# Patient Record
Sex: FEMALE | Race: WHITE | NOT HISPANIC OR LATINO
[De-identification: names, ages, dates, MRNs, and addresses within clinical notes are randomized per-mention and may not be internally consistent; named-entity substitution may affect disease eponyms.]

---

## 2018-11-09 ENCOUNTER — RESULT REVIEW (OUTPATIENT)
Age: 58
End: 2018-11-09

## 2019-11-21 ENCOUNTER — RESULT REVIEW (OUTPATIENT)
Age: 59
End: 2019-11-21

## 2020-12-10 ENCOUNTER — RESULT REVIEW (OUTPATIENT)
Age: 60
End: 2020-12-10

## 2021-03-11 ENCOUNTER — RESULT REVIEW (OUTPATIENT)
Age: 61
End: 2021-03-11

## 2021-10-26 ENCOUNTER — APPOINTMENT (OUTPATIENT)
Dept: HEART AND VASCULAR | Facility: CLINIC | Age: 61
End: 2021-10-26
Payer: COMMERCIAL

## 2021-10-26 ENCOUNTER — NON-APPOINTMENT (OUTPATIENT)
Age: 61
End: 2021-10-26

## 2021-10-26 VITALS
SYSTOLIC BLOOD PRESSURE: 122 MMHG | TEMPERATURE: 97.1 F | RESPIRATION RATE: 16 BRPM | HEART RATE: 72 BPM | HEIGHT: 66.5 IN | BODY MASS INDEX: 36.53 KG/M2 | WEIGHT: 230 LBS | DIASTOLIC BLOOD PRESSURE: 88 MMHG | OXYGEN SATURATION: 93 %

## 2021-10-26 DIAGNOSIS — Z82.49 FAMILY HISTORY OF ISCHEMIC HEART DISEASE AND OTHER DISEASES OF THE CIRCULATORY SYSTEM: ICD-10-CM

## 2021-10-26 DIAGNOSIS — Z78.9 OTHER SPECIFIED HEALTH STATUS: ICD-10-CM

## 2021-10-26 DIAGNOSIS — R01.1 CARDIAC MURMUR, UNSPECIFIED: ICD-10-CM

## 2021-10-26 DIAGNOSIS — E73.9 LACTOSE INTOLERANCE, UNSPECIFIED: ICD-10-CM

## 2021-10-26 DIAGNOSIS — K90.41 NON-CELIAC GLUTEN SENSITIVITY: ICD-10-CM

## 2021-10-26 DIAGNOSIS — Z86.39 PERSONAL HISTORY OF OTHER ENDOCRINE, NUTRITIONAL AND METABOLIC DISEASE: ICD-10-CM

## 2021-10-26 DIAGNOSIS — Z00.00 ENCOUNTER FOR GENERAL ADULT MEDICAL EXAMINATION W/OUT ABNORMAL FINDINGS: ICD-10-CM

## 2021-10-26 DIAGNOSIS — E03.2 HYPOTHYROIDISM DUE TO MEDICAMENTS AND OTHER EXOGENOUS SUBSTANCES: ICD-10-CM

## 2021-10-26 DIAGNOSIS — Z87.19 PERSONAL HISTORY OF OTHER DISEASES OF THE DIGESTIVE SYSTEM: ICD-10-CM

## 2021-10-26 DIAGNOSIS — Z87.898 PERSONAL HISTORY OF OTHER SPECIFIED CONDITIONS: ICD-10-CM

## 2021-10-26 DIAGNOSIS — Z87.09 PERSONAL HISTORY OF OTHER DISEASES OF THE RESPIRATORY SYSTEM: ICD-10-CM

## 2021-10-26 PROCEDURE — 99205 OFFICE O/P NEW HI 60 MIN: CPT

## 2021-10-26 PROCEDURE — 93000 ELECTROCARDIOGRAM COMPLETE: CPT

## 2021-10-26 RX ORDER — SIMVASTATIN 20 MG/1
20 TABLET, FILM COATED ORAL AT BEDTIME
Refills: 0 | Status: ACTIVE | COMMUNITY

## 2021-10-26 RX ORDER — CARVEDILOL 12.5 MG/1
12.5 TABLET, FILM COATED ORAL AT BEDTIME
Refills: 0 | Status: ACTIVE | COMMUNITY

## 2021-10-26 RX ORDER — LEVOTHYROXINE SODIUM 175 UG/1
175 TABLET ORAL EVERY MORNING
Refills: 0 | Status: ACTIVE | COMMUNITY

## 2021-10-26 RX ORDER — LOSARTAN POTASSIUM 50 MG/1
50 TABLET, FILM COATED ORAL EVERY MORNING
Refills: 0 | Status: ACTIVE | COMMUNITY

## 2021-10-26 RX ORDER — HYDROCHLOROTHIAZIDE 12.5 MG/1
12.5 TABLET ORAL EVERY MORNING
Refills: 0 | Status: ACTIVE | COMMUNITY

## 2021-10-26 NOTE — PHYSICAL EXAM

## 2021-10-26 NOTE — HISTORY OF PRESENT ILLNESS
[FreeTextEntry1] : Diana Ling is a 60 yo female who presents for CV evaluation.\par \par She denies cp, sob, pnd, orthopnea, edema, palp, or loc.\par \par She is active.  She is compliant with meds.\par \par ECG today reveals NSR, low voltage\par \par Clinical hx reviewed in detail.\par \par Recommendations:\par 1. collect records\par 2. blood work\par 3. EXSE\par 4. CPET\par 5. carotid duplex\par 6. exercise\par 7. dietary counseling

## 2021-10-26 NOTE — REVIEW OF SYSTEMS
[Earache] : no earache [Discharge From Ears] : no discharge from the ears [Hearing Loss] : no hearing loss [Mouth Sores] : no mouth sores [Sore Throat] : no sore throat [Sinus Pressure] : sinus pressure [Tinnitus] : no tinnitus [Vertigo] : no vertigo [Negative] : Heme/Lymph

## 2021-10-26 NOTE — REASON FOR VISIT
[Structural Heart and Valve Disease] : structural heart and valve disease [Hyperlipidemia] : hyperlipidemia [Hypertension] : hypertension [FreeTextEntry3] : Noe Clark

## 2021-10-26 NOTE — DISCUSSION/SUMMARY
[Hyperlipidemia] : hyperlipidemia [Diet Modification] : diet modification [Exercise] : exercise [Hypertension] : hypertension [Stable] : stable [None] : There are no changes in medication management [Exercise Regimen] : an exercise regimen [Dietary Modification] : dietary modification [Weight Loss] : weight loss [Low Sodium Diet] : low sodium diet [Patient] : the patient [FreeTextEntry1] : murmur

## 2021-10-27 LAB
ALBUMIN SERPL ELPH-MCNC: 4.9 G/DL
ALP BLD-CCNC: 76 U/L
ALT SERPL-CCNC: 32 U/L
ANION GAP SERPL CALC-SCNC: 14 MMOL/L
AST SERPL-CCNC: 26 U/L
BASOPHILS # BLD AUTO: 0.06 K/UL
BASOPHILS NFR BLD AUTO: 0.8 %
BILIRUB SERPL-MCNC: 0.5 MG/DL
BUN SERPL-MCNC: 13 MG/DL
CALCIUM SERPL-MCNC: 10.3 MG/DL
CHLORIDE SERPL-SCNC: 101 MMOL/L
CHOLEST SERPL-MCNC: 173 MG/DL
CO2 SERPL-SCNC: 26 MMOL/L
CREAT SERPL-MCNC: 0.76 MG/DL
EOSINOPHIL # BLD AUTO: 0.26 K/UL
EOSINOPHIL NFR BLD AUTO: 3.6 %
GLUCOSE SERPL-MCNC: 103 MG/DL
HCT VFR BLD CALC: 41.2 %
HDLC SERPL-MCNC: 52 MG/DL
HGB BLD-MCNC: 13.5 G/DL
IMM GRANULOCYTES NFR BLD AUTO: 0.3 %
LDLC SERPL CALC-MCNC: 90 MG/DL
LYMPHOCYTES # BLD AUTO: 2.26 K/UL
LYMPHOCYTES NFR BLD AUTO: 31.7 %
MAN DIFF?: NORMAL
MCHC RBC-ENTMCNC: 31.8 PG
MCHC RBC-ENTMCNC: 32.8 GM/DL
MCV RBC AUTO: 96.9 FL
MONOCYTES # BLD AUTO: 0.66 K/UL
MONOCYTES NFR BLD AUTO: 9.3 %
NEUTROPHILS # BLD AUTO: 3.87 K/UL
NEUTROPHILS NFR BLD AUTO: 54.3 %
NONHDLC SERPL-MCNC: 121 MG/DL
PLATELET # BLD AUTO: 350 K/UL
POTASSIUM SERPL-SCNC: 4 MMOL/L
PROT SERPL-MCNC: 7.4 G/DL
RBC # BLD: 4.25 M/UL
RBC # FLD: 13 %
SODIUM SERPL-SCNC: 141 MMOL/L
TRIGL SERPL-MCNC: 152 MG/DL
WBC # FLD AUTO: 7.13 K/UL

## 2021-12-17 ENCOUNTER — APPOINTMENT (OUTPATIENT)
Dept: HEART AND VASCULAR | Facility: CLINIC | Age: 61
End: 2021-12-17
Payer: COMMERCIAL

## 2021-12-17 PROCEDURE — 93880 EXTRACRANIAL BILAT STUDY: CPT

## 2021-12-23 ENCOUNTER — NON-APPOINTMENT (OUTPATIENT)
Age: 61
End: 2021-12-23

## 2022-01-13 ENCOUNTER — TRANSCRIPTION ENCOUNTER (OUTPATIENT)
Age: 62
End: 2022-01-13

## 2022-02-01 ENCOUNTER — APPOINTMENT (OUTPATIENT)
Dept: HEART AND VASCULAR | Facility: CLINIC | Age: 62
End: 2022-02-01
Payer: COMMERCIAL

## 2022-02-01 VITALS
SYSTOLIC BLOOD PRESSURE: 122 MMHG | BODY MASS INDEX: 39.99 KG/M2 | HEIGHT: 65 IN | OXYGEN SATURATION: 98 % | TEMPERATURE: 97.9 F | DIASTOLIC BLOOD PRESSURE: 88 MMHG | WEIGHT: 240 LBS

## 2022-02-01 PROCEDURE — 94010 BREATHING CAPACITY TEST: CPT | Mod: 59

## 2022-02-01 PROCEDURE — 94621 CARDIOPULM EXERCISE TESTING: CPT

## 2022-03-17 ENCOUNTER — RESULT REVIEW (OUTPATIENT)
Age: 62
End: 2022-03-17

## 2022-03-29 ENCOUNTER — APPOINTMENT (OUTPATIENT)
Dept: HEART AND VASCULAR | Facility: CLINIC | Age: 62
End: 2022-03-29
Payer: COMMERCIAL

## 2022-03-29 VITALS
HEART RATE: 71 BPM | HEIGHT: 65 IN | WEIGHT: 242 LBS | BODY MASS INDEX: 40.32 KG/M2 | OXYGEN SATURATION: 97 % | SYSTOLIC BLOOD PRESSURE: 110 MMHG | DIASTOLIC BLOOD PRESSURE: 76 MMHG

## 2022-03-29 PROCEDURE — 93320 DOPPLER ECHO COMPLETE: CPT

## 2022-03-29 PROCEDURE — 93351 STRESS TTE COMPLETE: CPT

## 2022-03-29 PROCEDURE — 93325 DOPPLER ECHO COLOR FLOW MAPG: CPT

## 2022-03-31 ENCOUNTER — NON-APPOINTMENT (OUTPATIENT)
Age: 62
End: 2022-03-31

## 2022-04-15 ENCOUNTER — APPOINTMENT (OUTPATIENT)
Dept: HEART AND VASCULAR | Facility: CLINIC | Age: 62
End: 2022-04-15
Payer: COMMERCIAL

## 2022-04-15 PROCEDURE — 99443: CPT

## 2022-04-15 NOTE — REVIEW OF SYSTEMS
[Earache] : no earache [Discharge From Ears] : no discharge from the ears [Hearing Loss] : no hearing loss [Mouth Sores] : no mouth sores [Sore Throat] : no sore throat [Sinus Pressure] : no sinus pressure [Tinnitus] : no tinnitus [Vertigo] : no vertigo [Negative] : Heme/Lymph

## 2022-04-15 NOTE — HISTORY OF PRESENT ILLNESS
[FreeTextEntry1] : Diana Ling requests telephone follow up.  Consent obtained and recorded.  She is at her home and doctor is in [place], [place].  \par \par She denies cp, sob, pnd, orthopnea, edema, palp, or loc.\par \par She is active.  She is compliant with meds.\par \par Carotid Duplex 12/2021: nl\par CPET 2/2022: dec RER; 32% predicted peak VO2\par EXSE 3/2022: nl lv sys fxn; indeterminant reece fxn; LVH; aortic root 3.9 cm; mild AI; 6:15 min Boris; pos ECG; no ischemia by WM ( dec sens dec hr)\par \par Clinical hx and results reviewed in detail.\par \par PE from 10/2021 eval.\par \par Recommendations:\par 1. collect records\par 2. continue CV meds\par 3. exercise\par 4. dietary counseling\par 5. get CTA

## 2022-04-15 NOTE — REASON FOR VISIT
[Structural Heart and Valve Disease] : structural heart and valve disease [Hyperlipidemia] : hyperlipidemia [Hypertension] : hypertension [Coronary Artery Disease] : coronary artery disease [Carotid, Aortic and Peripheral Vascular Disease] : carotid, aortic and peripheral vascular disease [FreeTextEntry3] : Noe Clark

## 2022-04-15 NOTE — PHYSICAL EXAM

## 2022-04-15 NOTE — DISCUSSION/SUMMARY
[Sodium Restriction] : sodium restriction [Possible Cardiac Ischemia (Intermd Prob)] : possible cardiac ischemia (intermediate probability) [Deteriorating] : deteriorating [Weight Reduction] : weight reduction [Hyperlipidemia] : hyperlipidemia [Diet Modification] : diet modification [Exercise] : exercise [Hypertension] : hypertension [Exercise Regimen] : an exercise regimen [Dietary Modification] : dietary modification [Weight Loss] : weight loss [Low Sodium Diet] : low sodium diet [Stable] : stable [None] : There are no changes in medication management [Exercise Rehab] : exercise rehabilitation [Patient] : the patient [de-identified] : abnl stress ECG [de-identified] : dilated aortic root 3.9 cm [FreeTextEntry1] : AI - mild

## 2022-05-10 ENCOUNTER — RESULT REVIEW (OUTPATIENT)
Age: 62
End: 2022-05-10

## 2022-05-17 ENCOUNTER — APPOINTMENT (OUTPATIENT)
Dept: BREAST CENTER | Facility: CLINIC | Age: 62
End: 2022-05-17
Payer: COMMERCIAL

## 2022-05-17 ENCOUNTER — NON-APPOINTMENT (OUTPATIENT)
Age: 62
End: 2022-05-17

## 2022-05-17 DIAGNOSIS — Z86.79 PERSONAL HISTORY OF OTHER DISEASES OF THE CIRCULATORY SYSTEM: ICD-10-CM

## 2022-05-17 DIAGNOSIS — R92.8 OTHER ABNORMAL AND INCONCLUSIVE FINDINGS ON DIAGNOSTIC IMAGING OF BREAST: ICD-10-CM

## 2022-05-17 DIAGNOSIS — D24.1 BENIGN NEOPLASM OF RIGHT BREAST: ICD-10-CM

## 2022-05-17 PROCEDURE — 99203 OFFICE O/P NEW LOW 30 MIN: CPT

## 2022-05-17 NOTE — HISTORY OF PRESENT ILLNESS
[FreeTextEntry1] : The patient is a 62-year-old nulliparous postmenopausal white female with [place]er, Mexican, English, and Canadian descent.  She underwent menarche at age 14.  She underwent menopause at age 49 and never took any hormone replacement therapy.  She has no family history of breast or ovarian cancer.  She has a history of undergoing a bilateral reduction mastopexy in 2021.  She underwent a screening mammography and ultrasound performed at [facility] on March 7, 2022 showing a density in the right breast lower inner quadrant measuring about 5 to 6 mm which was later seen on directed ultrasound.  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  This was very close to a previous biopsy which had been performed in the past ("buckle" clip).  She comes in now for a surgical evaluation.

## 2022-05-17 NOTE — PHYSICAL EXAM
[Normocephalic] : normocephalic [Atraumatic] : atraumatic [EOMI] : extra ocular movement intact [Supple] : supple [No Supraclavicular Adenopathy] : no supraclavicular adenopathy [No Cervical Adenopathy] : no cervical adenopathy [Examined in the supine and seated position] : examined in the supine and seated position [No dominant masses] : no dominant masses in right breast  [No dominant masses] : no dominant masses left breast [No Nipple Retraction] : no left nipple retraction [No Nipple Discharge] : no left nipple discharge [Breast Mass Right Breast ___cm] : no masses [Breast Mass Left Breast ___cm] : no masses [Breast Nipple Inversion] : nipples not inverted [Breast Nipple Retraction] : nipples not retracted [Breast Nipple Flattening] : nipples not flattened [Breast Nipple Fissures] : nipples not fissured [Breast Abnormal Lactation (Galactorrhea)] : no galactorrhea [Breast Abnormal Secretion Bloody Fluid] : no bloody discharge [Breast Abnormal Secretion Serous Fluid] : no serous discharge [Breast Abnormal Secretion Opalescent Fluid] : no milky discharge [No Axillary Lymphadenopathy] : no left axillary lymphadenopathy [No Edema] : no edema [No Rashes] : no rashes [No Ulceration] : no ulceration [de-identified] : On exam, she has ptotic C-cup breasts with obvious bilateral reduction mastopexy incisions.  On palpation, I cannot feel any suspicious densities in either breast even with close attention to the lower aspect of the right breast.  She has no axillary, supraclavicular, or cervical adenopathy.

## 2022-05-17 NOTE — ADDENDUM
[FreeTextEntry1] : Greater than 75% of the consultation was performed face-to-face involving coordination of care and counseling.

## 2022-05-17 NOTE — REASON FOR VISIT
[Consultation] : a consultation visit [FreeTextEntry1] : The patient comes in and is of Maldivian English Indonesian Slovak descent and she underwent an ultrasound-guided core biopsy of the right breast 6:00 density seen on mammography and ultrasound and pathology showed a transected intraductal papilloma.  She comes in now for a surgical evaluation.

## 2022-05-20 ENCOUNTER — RESULT REVIEW (OUTPATIENT)
Age: 62
End: 2022-05-20

## 2022-05-26 ENCOUNTER — NON-APPOINTMENT (OUTPATIENT)
Age: 62
End: 2022-05-26

## 2022-05-27 ENCOUNTER — APPOINTMENT (OUTPATIENT)
Dept: HEART AND VASCULAR | Facility: CLINIC | Age: 62
End: 2022-05-27
Payer: COMMERCIAL

## 2022-05-27 PROCEDURE — 99443: CPT

## 2022-05-27 NOTE — HISTORY OF PRESENT ILLNESS
[FreeTextEntry1] : Diana Ling requests telephone follow up.  Consent obtained and recorded.  She is at her home and doctor is in [place], [place].    \par \par She denies cp, sob, pnd, orthopnea, edema, palp, or loc.\par \par She is active.  She is compliant with meds.\par \par Carotid Duplex 12/2021: nl\par CPET 2/2022: dec RER; 32% predicted peak VO2\par EXSE 3/2022: nl lv sys fxn; indeterminant reece fxn; LVH; aortic root 3.9 cm; mild AI; 6:15 min Boris; pos ECG; no ischemia by WM ( dec sens dec hr)\par CTA 5/2022: min LAD plaque; LAD bridge (shallow)\par \par Clinical hx and results reviewed in detail.\par \par PE from 10/2021 eval.\par \par Recommendations:\par 1. collect records\par 2. continue CV meds\par 3. exercise\par 4. dietary counseling - Mediterranean diet\par 5. f/u in 4 months

## 2022-05-27 NOTE — DISCUSSION/SUMMARY
[Sodium Restriction] : sodium restriction [Coronary Artery Disease] : coronary artery disease [Possible Cardiac Ischemia (Intermd Prob)] : possible cardiac ischemia (intermediate probability) [Weight Reduction] : weight reduction [Hyperlipidemia] : hyperlipidemia [Diet Modification] : diet modification [Exercise] : exercise [Hypertension] : hypertension [Exercise Regimen] : an exercise regimen [Dietary Modification] : dietary modification [Weight Loss] : weight loss [Low Sodium Diet] : low sodium diet [Stable] : stable [None] : There are no changes in medication management [Exercise Rehab] : exercise rehabilitation [Patient] : the patient [de-identified] : LAD min plaque and bridge by CTA 5/2022; abnl stress ECG [de-identified] : dilated aortic root 3.9 cm [FreeTextEntry1] : AI - mild

## 2022-05-27 NOTE — PHYSICAL EXAM

## 2022-09-27 ENCOUNTER — APPOINTMENT (OUTPATIENT)
Dept: HEART AND VASCULAR | Facility: CLINIC | Age: 62
End: 2022-09-27

## 2022-09-27 VITALS
RESPIRATION RATE: 16 BRPM | BODY MASS INDEX: 39.99 KG/M2 | HEIGHT: 65 IN | TEMPERATURE: 97.8 F | OXYGEN SATURATION: 97 % | SYSTOLIC BLOOD PRESSURE: 110 MMHG | DIASTOLIC BLOOD PRESSURE: 80 MMHG | WEIGHT: 240 LBS | HEART RATE: 74 BPM

## 2022-09-27 DIAGNOSIS — R94.39 ABNORMAL RESULT OF OTHER CARDIOVASCULAR FUNCTION STUDY: ICD-10-CM

## 2022-09-27 PROCEDURE — 99215 OFFICE O/P EST HI 40 MIN: CPT

## 2022-09-27 RX ORDER — DIAZEPAM 5 MG/1
5 TABLET ORAL
Qty: 2 | Refills: 0 | Status: DISCONTINUED | COMMUNITY
Start: 2022-05-11 | End: 2022-09-27

## 2022-09-27 NOTE — DISCUSSION/SUMMARY
[Sodium Restriction] : sodium restriction [Coronary Artery Disease] : coronary artery disease [Possible Cardiac Ischemia (Intermd Prob)] : possible cardiac ischemia (intermediate probability) [Weight Reduction] : weight reduction [Hyperlipidemia] : hyperlipidemia [Diet Modification] : diet modification [Exercise] : exercise [Hypertension] : hypertension [Exercise Regimen] : an exercise regimen [Dietary Modification] : dietary modification [Weight Loss] : weight loss [Low Sodium Diet] : low sodium diet [Stable] : stable [None] : There are no changes in medication management [Exercise Rehab] : exercise rehabilitation [Patient] : the patient [de-identified] : LAD min plaque and bridge by CTA 5/2022; abnl stress ECG [de-identified] : dilated aortic root 3.9 cm [FreeTextEntry1] : AI - mild

## 2022-09-27 NOTE — PHYSICAL EXAM

## 2022-09-27 NOTE — HISTORY OF PRESENT ILLNESS
[FreeTextEntry1] : Diana Ling returns for follow up.      \par \par She denies cp, sob, pnd, orthopnea, edema, palp, or loc.\par \par She is active.  She is compliant with meds.\par \par Carotid Duplex 12/2021: nl\par CPET 2/2022: dec RER; 32% predicted peak VO2\par EXSE 3/2022: nl lv sys fxn; indeterminant reece fxn; LVH; aortic root 3.9 cm; mild AI; 6:15 min Boris; pos ECG; no ischemia by WM ( dec sens dec hr)\par CTA 5/2022: min LAD plaque; LAD bridge (shallow)\par \par Clinical hx reviewed in detail.\par \par Recommendations:\par 1. collect records\par 2. continue CV meds\par 3. exercise\par 4. dietary counseling - Mediterranean diet\par 5. f/u in 6 months

## 2022-11-08 NOTE — HISTORY OF PRESENT ILLNESS
[FreeTextEntry1] : The patient is a 62-year-old nulliparous postmenopausal white female with Kuwaiti, Algerian, English, and Canadian descent.  She underwent menarche at age 14.  She underwent menopause at age 49 and never took any hormone replacement therapy.  She has no family history of breast or ovarian cancer.  She has a history of undergoing a bilateral reduction mastopexy in 2021.  She underwent a screening mammography and ultrasound performed at [facility] on March 7, 2022 showing a density in the right breast lower inner quadrant measuring about 5 to 6 mm which was later seen on directed ultrasound.  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  This was very close to a previous biopsy which had been performed in the past ("buckle" clip).  After consultation back in May 2022 it was felt that this area could be followed and she comes in now for follow-up evaluation.

## 2022-11-08 NOTE — REASON FOR VISIT
[Follow-Up: _____] : a [unfilled] follow-up visit [FreeTextEntry1] : The patient comes in and is of Kosovan English Sami Nepali descent and she underwent an ultrasound-guided core biopsy of the right breast 6:00 density seen on mammography and ultrasound from March 2022 and pathology showed a transected intraductal papilloma.  It was decided to follow this area clinically and she comes in now for follow-up.

## 2022-11-08 NOTE — ASSESSMENT
[FreeTextEntry1] : The patient is a 62-year-old nulliparous postmenopausal white female with Chinese, Andorran, English, and Algerian descent.  She underwent menarche at age 14.  She underwent menopause at age 49 and never took any hormone replacement therapy.  She has no family history of breast or ovarian cancer.  She has a history of undergoing a bilateral reduction mastopexy in 2021.  She underwent a screening mammography and ultrasound performed at [facility] on March 7, 2022 showing a density in the right breast lower inner quadrant measuring about 5 to 6 mm which was later seen on directed ultrasound.  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  This was very close to a previous biopsy which had been performed in the past ("buckle" clip).  I reviewed her imaging and indeed she had this density seen in the right breast lower inner quadrant on mammography from May 7, 2022 and ultrasound did show a corresponding mass which was biopsied showing this intraductal papilloma.  It was felt to have a fairly benign appearance on imaging and close follow-up was recommended.  She underwent a follow-up diagnostic right breast mammography and ultrasound which was reviewed from ??????????. The patient was reassured and should just go back to her routine bilateral mammography and ultrasound again in March 2023.  I would like to see her again at that time and if everything is unchanged she can then go back to yearly follow-up with yearly mammography and ultrasound.

## 2022-11-08 NOTE — PHYSICAL EXAM
[Normocephalic] : normocephalic [Atraumatic] : atraumatic [EOMI] : extra ocular movement intact [Supple] : supple [No Supraclavicular Adenopathy] : no supraclavicular adenopathy [No Cervical Adenopathy] : no cervical adenopathy [Examined in the supine and seated position] : examined in the supine and seated position [No dominant masses] : no dominant masses in right breast  [No dominant masses] : no dominant masses left breast [No Nipple Retraction] : no left nipple retraction [No Nipple Discharge] : no left nipple discharge [Breast Mass Right Breast ___cm] : no masses [Breast Mass Left Breast ___cm] : no masses [No Axillary Lymphadenopathy] : no left axillary lymphadenopathy [No Edema] : no edema [No Rashes] : no rashes [No Ulceration] : no ulceration [Breast Nipple Inversion] : nipples not inverted [Breast Nipple Retraction] : nipples not retracted [Breast Nipple Flattening] : nipples not flattened [Breast Nipple Fissures] : nipples not fissured [Breast Abnormal Lactation (Galactorrhea)] : no galactorrhea [Breast Abnormal Secretion Bloody Fluid] : no bloody discharge [Breast Abnormal Secretion Serous Fluid] : no serous discharge [Breast Abnormal Secretion Opalescent Fluid] : no milky discharge [de-identified] : On exam, she has ptotic C-cup breasts with obvious bilateral reduction mastopexy incisions.  On palpation, I cannot feel any suspicious densities in either breast even with close attention to the lower aspect of the right breast.  She has no axillary, supraclavicular, or cervical adenopathy.

## 2022-11-14 ENCOUNTER — APPOINTMENT (OUTPATIENT)
Dept: BREAST CENTER | Facility: CLINIC | Age: 62
End: 2022-11-14

## 2022-12-15 ENCOUNTER — APPOINTMENT (OUTPATIENT)
Dept: BREAST CENTER | Facility: CLINIC | Age: 62
End: 2022-12-15

## 2022-12-15 VITALS
BODY MASS INDEX: 39.94 KG/M2 | DIASTOLIC BLOOD PRESSURE: 85 MMHG | WEIGHT: 240 LBS | OXYGEN SATURATION: 96 % | SYSTOLIC BLOOD PRESSURE: 134 MMHG | HEART RATE: 75 BPM

## 2022-12-15 DIAGNOSIS — N64.4 MASTODYNIA: ICD-10-CM

## 2022-12-15 DIAGNOSIS — D24.1 BENIGN NEOPLASM OF RIGHT BREAST: ICD-10-CM

## 2022-12-15 DIAGNOSIS — N60.11 DIFFUSE CYSTIC MASTOPATHY OF LEFT BREAST: ICD-10-CM

## 2022-12-15 DIAGNOSIS — N60.12 DIFFUSE CYSTIC MASTOPATHY OF LEFT BREAST: ICD-10-CM

## 2022-12-15 PROCEDURE — 99213 OFFICE O/P EST LOW 20 MIN: CPT

## 2022-12-15 NOTE — ASSESSMENT
[FreeTextEntry1] : The patient is a 62-year-old nulliparous postmenopausal white female with Macedonian, Iranian, English, and Togolese descent.  She underwent menarche at age 14.  She underwent menopause at age 49 and never took any hormone replacement therapy.  She has no family history of breast or ovarian cancer.  She has a history of undergoing a bilateral reduction mastopexy in 2021.  She underwent a screening mammography and ultrasound performed at [facility] on March 7, 2022 showing a density in the right breast lower inner quadrant measuring about 5 to 6 mm which was later seen on directed ultrasound.  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  This was very close to a previous biopsy which had been performed in the past ("buckle" clip).  I reviewed her imaging and indeed she had this density seen in the right breast lower inner quadrant on mammography from March 7, 2022 and ultrasound did show a corresponding mass which was biopsied showing this intraductal papilloma.  It was felt to have a fairly benign appearance on imaging and close follow-up was recommended.  She is due for a follow-up diagnostic right breast mammography and ultrasound now.  On exam today I cannot feel any suspicious densities in either breast and she does have the typical bilateral Wise reduction pattern incisions.  She is complaining of some tenderness on the inframammary aspect of the right breast which feels like its in the scarring on the inframammary incision.  The patient was reassured it is due for diagnostic right breast mammography and ultrasound now and was given prescriptions.  If that is unchanged and negative she should just go back to her routine yearly bilateral mammography and ultrasound again in March 2023.  I would like to see her again at that time and if everything is unchanged she can then go back to yearly follow-up with yearly mammography and ultrasound.

## 2022-12-15 NOTE — REASON FOR VISIT
[Follow-Up: _____] : a [unfilled] follow-up visit [FreeTextEntry1] : The patient comes in and is of Bol[place]ian English Khmer Frisian descent and she underwent an ultrasound-guided core biopsy of the right breast 6:00 density seen on mammography and ultrasound from March 2022 and pathology showed a transected intraductal papilloma.  It was decided to follow this area clinically and she comes in now for follow-up.

## 2022-12-15 NOTE — PHYSICAL EXAM
[Normocephalic] : normocephalic [Atraumatic] : atraumatic [EOMI] : extra ocular movement intact [Supple] : supple [No Supraclavicular Adenopathy] : no supraclavicular adenopathy [No Cervical Adenopathy] : no cervical adenopathy [Examined in the supine and seated position] : examined in the supine and seated position [No dominant masses] : no dominant masses in right breast  [No dominant masses] : no dominant masses left breast [No Nipple Retraction] : no left nipple retraction [No Nipple Discharge] : no left nipple discharge [Breast Mass Right Breast ___cm] : no masses [Breast Mass Left Breast ___cm] : no masses [No Axillary Lymphadenopathy] : no left axillary lymphadenopathy [No Edema] : no edema [No Rashes] : no rashes [No Ulceration] : no ulceration [Breast Nipple Inversion] : nipples not inverted [Breast Nipple Retraction] : nipples not retracted [Breast Nipple Flattening] : nipples not flattened [Breast Nipple Fissures] : nipples not fissured [Breast Abnormal Lactation (Galactorrhea)] : no galactorrhea [Breast Abnormal Secretion Bloody Fluid] : no bloody discharge [Breast Abnormal Secretion Serous Fluid] : no serous discharge [Breast Abnormal Secretion Opalescent Fluid] : no milky discharge [de-identified] : On exam, she has ptotic C-cup breasts with obvious bilateral reduction mastopexy incisions.  On palpation, I cannot feel any suspicious densities in either breast even with close attention to the lower aspect of the right breast.  She is complaining of some tenderness on the inframammary right breast incision but I cannot feel any suspicious findings.  She has no axillary, supraclavicular, or cervical adenopathy. [de-identified] : Status post reduction mastopexy with tenderness on the inframammary incision but no suspicious findings. [de-identified] : Status post reduction mastopexy with no suspicious findings

## 2022-12-15 NOTE — HISTORY OF PRESENT ILLNESS
[FreeTextEntry1] : The patient is a 62-year-old nulliparous postmenopausal white female with Sammarinese, Tunisian, English, and [place] Islander descent.  She underwent menarche at age 14.  She underwent menopause at age 49 and never took any hormone replacement therapy.  She has no family history of breast or ovarian cancer.  She has a history of undergoing a bilateral reduction mastopexy in 2021.  She underwent a screening mammography and ultrasound performed at [facility] on March 7, 2022 showing a density in the right breast lower inner quadrant measuring about 5 to 6 mm which was later seen on directed ultrasound.  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  This was very close to a previous biopsy which had been performed in the past ("buckle" clip).  After consultation back in May 2022 it was felt that this area could be followed and she comes in now for follow-up.

## 2023-03-08 ENCOUNTER — RESULT REVIEW (OUTPATIENT)
Age: 63
End: 2023-03-08

## 2023-05-03 ENCOUNTER — APPOINTMENT (OUTPATIENT)
Dept: HEART AND VASCULAR | Facility: CLINIC | Age: 63
End: 2023-05-03
Payer: COMMERCIAL

## 2023-05-03 VITALS
HEART RATE: 79 BPM | OXYGEN SATURATION: 96 % | HEIGHT: 65 IN | TEMPERATURE: 97.8 F | WEIGHT: 241 LBS | BODY MASS INDEX: 40.15 KG/M2 | RESPIRATION RATE: 17 BRPM | DIASTOLIC BLOOD PRESSURE: 70 MMHG | SYSTOLIC BLOOD PRESSURE: 110 MMHG

## 2023-05-03 PROCEDURE — 99215 OFFICE O/P EST HI 40 MIN: CPT

## 2023-05-06 NOTE — DISCUSSION/SUMMARY
[Sodium Restriction] : sodium restriction [Coronary Artery Disease] : coronary artery disease [Possible Cardiac Ischemia (Intermd Prob)] : possible cardiac ischemia (intermediate probability) [Weight Reduction] : weight reduction [Hyperlipidemia] : hyperlipidemia [Diet Modification] : diet modification [Exercise] : exercise [Hypertension] : hypertension [Exercise Regimen] : an exercise regimen [Dietary Modification] : dietary modification [Weight Loss] : weight loss [Low Sodium Diet] : low sodium diet [Stable] : stable [None] : There are no changes in medication management [Patient] : the patient [Exercise Rehab] : exercise rehabilitation [de-identified] : LAD min plaque and bridge by CTA 5/2022; abnl stress ECG [de-identified] : dilated aortic root 3.9 cm [FreeTextEntry1] : AI - mild

## 2023-05-06 NOTE — PHYSICAL EXAM
[Well Developed] : well developed [Well Nourished] : well nourished [No Acute Distress] : no acute distress [Normal Conjunctiva] : normal conjunctiva [Normal Venous Pressure] : normal venous pressure [No Carotid Bruit] : no carotid bruit [Normal S1, S2] : normal S1, S2 [No Rub] : no rub [No Gallop] : no gallop [Murmur] : murmur [Clear Lung Fields] : clear lung fields [Good Air Entry] : good air entry [No Respiratory Distress] : no respiratory distress  [Soft] : abdomen soft [Non Tender] : non-tender [Normal Bowel Sounds] : normal bowel sounds [No Masses/organomegaly] : no masses/organomegaly [Normal Gait] : normal gait [No Edema] : no edema [No Cyanosis] : no cyanosis [No Clubbing] : no clubbing [No Varicosities] : no varicosities [No Rash] : no rash [No Skin Lesions] : no skin lesions [Moves all extremities] : moves all extremities [No Focal Deficits] : no focal deficits [Normal Speech] : normal speech [Alert and Oriented] : alert and oriented [Normal memory] : normal memory [de-identified] : reece murmur

## 2023-05-06 NOTE — HISTORY OF PRESENT ILLNESS
[FreeTextEntry1] : Diana Ling returns for follow up.      \par \par She is recovering from bunionectomy surgery.\par \par She denies cp, sob, pnd, orthopnea, edema, palp, or loc.\par \par She is active.  She is compliant with meds.\par \par Carotid Duplex 12/2021: nl\par CPET 2/2022: dec RER; 32% predicted peak VO2\par EXSE 3/2022: nl lv sys fxn; indeterminant reece fxn; LVH; aortic root 3.9 cm; mild AI; 6:15 min Boris; pos ECG; no ischemia by WM ( dec sens dec hr)\par CTA 5/2022: min LAD plaque; LAD bridge (shallow)\par \par Clinical hx reviewed in detail.\par \par Recommendations:\par 1. collect records\par 2. continue CV meds\par 3. exercise\par 4. dietary counseling - Mediterranean diet\par 5. f/u in 6 months

## 2023-11-06 ENCOUNTER — NON-APPOINTMENT (OUTPATIENT)
Age: 63
End: 2023-11-06

## 2023-11-06 ENCOUNTER — APPOINTMENT (OUTPATIENT)
Dept: HEART AND VASCULAR | Facility: CLINIC | Age: 63
End: 2023-11-06
Payer: COMMERCIAL

## 2023-11-06 VITALS
HEART RATE: 66 BPM | TEMPERATURE: 97.7 F | WEIGHT: 240 LBS | RESPIRATION RATE: 16 BRPM | OXYGEN SATURATION: 97 % | HEIGHT: 65 IN | SYSTOLIC BLOOD PRESSURE: 114 MMHG | DIASTOLIC BLOOD PRESSURE: 80 MMHG | BODY MASS INDEX: 39.99 KG/M2

## 2023-11-06 DIAGNOSIS — I51.7 CARDIOMEGALY: ICD-10-CM

## 2023-11-06 PROCEDURE — 99215 OFFICE O/P EST HI 40 MIN: CPT | Mod: 25

## 2023-11-06 PROCEDURE — 93000 ELECTROCARDIOGRAM COMPLETE: CPT

## 2023-12-26 NOTE — ASSESSMENT
[FreeTextEntry1] : The patient is a 62-year-old nulliparous postmenopausal white female with Montenegrin, Guyanese, English, and Mauritanian descent.  She underwent menarche at age 14.  She underwent menopause at age 49 and never took any hormone replacement therapy.  She has no family history of breast or ovarian cancer.  She has a history of undergoing a bilateral reduction mastopexy in 2021.  She underwent a screening mammography and ultrasound performed at [facility] on March 7, 2022 showing a density in the right breast lower inner quadrant measuring about 5 to 6 mm which was later seen on directed ultrasound.  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  This was very close to a previous biopsy which had been performed in the past ("buckle" clip).  I reviewed her imaging and indeed she had this density seen in the right breast lower inner quadrant on mammography from May 7, 2022 and ultrasound did show a corresponding mass which was biopsied showing this intraductal papilloma.  On exam, I cannot feel any suspicious densities even with close attention to the lower aspect of the right breast.  I spoke to the patient at length regarding this papilloma and given the lack of atypia there is no absolute need for excision especially since it had a fairly benign appearance on imaging.  This can be followed and I would like to see her again in 6 months around November 2022 and she should obtain a follow-up diagnostic right breast mammography and ultrasound at that time.  The patient agrees to proceed as planned and I will see her again in November 2022.
Patient

## 2024-04-23 ENCOUNTER — APPOINTMENT (OUTPATIENT)
Dept: HEART AND VASCULAR | Facility: CLINIC | Age: 64
End: 2024-04-23

## 2024-04-30 ENCOUNTER — NON-APPOINTMENT (OUTPATIENT)
Age: 64
End: 2024-04-30

## 2024-04-30 ENCOUNTER — APPOINTMENT (OUTPATIENT)
Dept: HEART AND VASCULAR | Facility: CLINIC | Age: 64
End: 2024-04-30
Payer: COMMERCIAL

## 2024-04-30 VITALS
HEART RATE: 67 BPM | SYSTOLIC BLOOD PRESSURE: 102 MMHG | HEIGHT: 65 IN | WEIGHT: 245 LBS | DIASTOLIC BLOOD PRESSURE: 80 MMHG | RESPIRATION RATE: 17 BRPM | OXYGEN SATURATION: 98 % | BODY MASS INDEX: 40.82 KG/M2 | TEMPERATURE: 97.8 F

## 2024-04-30 DIAGNOSIS — Q24.5 MALFORMATION OF CORONARY VESSELS: ICD-10-CM

## 2024-04-30 DIAGNOSIS — I77.810 THORACIC AORTIC ECTASIA: ICD-10-CM

## 2024-04-30 DIAGNOSIS — I10 ESSENTIAL (PRIMARY) HYPERTENSION: ICD-10-CM

## 2024-04-30 DIAGNOSIS — E78.5 HYPERLIPIDEMIA, UNSPECIFIED: ICD-10-CM

## 2024-04-30 DIAGNOSIS — I25.10 ATHEROSCLEROTIC HEART DISEASE OF NATIVE CORONARY ARTERY W/OUT ANGINA PECTORIS: ICD-10-CM

## 2024-04-30 DIAGNOSIS — I35.1 NONRHEUMATIC AORTIC (VALVE) INSUFFICIENCY: ICD-10-CM

## 2024-04-30 PROCEDURE — 99214 OFFICE O/P EST MOD 30 MIN: CPT | Mod: 25

## 2024-04-30 PROCEDURE — 93000 ELECTROCARDIOGRAM COMPLETE: CPT

## 2024-04-30 RX ORDER — PANTOPRAZOLE 20 MG/1
20 TABLET, DELAYED RELEASE ORAL
Refills: 0 | Status: ACTIVE | COMMUNITY

## 2024-04-30 NOTE — PHYSICAL EXAM

## 2024-04-30 NOTE — HISTORY OF PRESENT ILLNESS
[FreeTextEntry1] : Diana Ling returns for CV follow up. She has a PMHx of non-obstructive CAD (coronary CTA 5/2022), hypercholesterolemia, HTN, myocardial bridge, iatrogenic hypothyroidism, GERD and LVH.  She is requesting cardiac evaluation prior to cystoscopy and hysteroscopy under general anesthesia with Dr. Eliza Chavez at [facility] on 5/14/24.   The patient is currently doing well. She denies cp, sob, pnd, orthopnea, edema, palp, or loc.  She is active; states she is able to walk up 2 flights of stairs without any limiting symptoms (>4 METs). She is compliant with meds.  Cardiac Workup: EKG today reveals NSR Carotid Duplex 12/2021: nl CPET 2/2022: dec RER; 32% predicted peak VO2 EXSE 3/2022: nl lv sys fxn; indeterminant reece fxn; LVH; aortic root 3.9 cm; mild AI; 6:15 min Boris; pos ECG; no ischemia by WM ( dec sens dec hr) CTA 5/2022: min LAD plaque; LAD bridge (shallow)

## 2024-04-30 NOTE — DISCUSSION/SUMMARY
[Sodium Restriction] : sodium restriction [Coronary Artery Disease] : coronary artery disease [Possible Cardiac Ischemia (Intermd Prob)] : possible cardiac ischemia (intermediate probability) [Weight Reduction] : weight reduction [Hyperlipidemia] : hyperlipidemia [Diet Modification] : diet modification [Exercise] : exercise [Hypertension] : hypertension [Exercise Regimen] : an exercise regimen [Dietary Modification] : dietary modification [Weight Loss] : weight loss [Low Sodium Diet] : low sodium diet [Stable] : stable [None] : There are no changes in medication management [Exercise Rehab] : exercise rehabilitation [Patient] : the patient [Continue] : continuing beta blockers [EKG obtained to assist in diagnosis and management of assessed problem(s)] : EKG obtained to assist in diagnosis and management of assessed problem(s) [de-identified] : LAD min plaque and bridge by CTA 5/2022; abnl stress ECG [de-identified] : dilated aortic root 3.9 cm [de-identified] : TTCANDIE [FreeTextEntry1] : Clinical hx reviewed in detail.  65 y/o female w/ PMHx of non-obstructive CAD (coronary CTA 5/2022), hypercholesterolemia, HTN, myocardial bridge, iatrogenic hypothyroidism, GERD and LVH presents for CV follow up. She is requesting cardiac evaluation prior to cystoscopy and hysteroscopy under general anesthesia with Dr. Eliza Chavez at [facility] on 5/14/24. She currently denies any cardiac concerns or symptoms. No exertional symptoms.   EKG today NSR at 69bpm  Plan:  - continue CV meds; BB; thiazide; ARB; statin - exercise - dietary counseling - Mediterranean diet - EXSE scheduled for 5/8/24 - BP at goal today - She will be getting blood work with her PCP, Dr. Clark, later today - Would ensure continuous cardiac monitoring throughout procedure - cards follow up in 4-6 months

## 2024-04-30 NOTE — REASON FOR VISIT
[Structural Heart and Valve Disease] : structural heart and valve disease [Hyperlipidemia] : hyperlipidemia [Hypertension] : hypertension [Coronary Artery Disease] : coronary artery disease [Carotid, Aortic and Peripheral Vascular Disease] : carotid, aortic and peripheral vascular disease [FreeTextEntry3] : Noe Clark [FreeTextEntry1] : CV follow up.

## 2024-05-08 ENCOUNTER — APPOINTMENT (OUTPATIENT)
Dept: HEART AND VASCULAR | Facility: CLINIC | Age: 64
End: 2024-05-08
Payer: COMMERCIAL

## 2024-05-08 VITALS
HEIGHT: 65 IN | SYSTOLIC BLOOD PRESSURE: 120 MMHG | BODY MASS INDEX: 39.99 KG/M2 | OXYGEN SATURATION: 95 % | HEART RATE: 65 BPM | DIASTOLIC BLOOD PRESSURE: 80 MMHG | WEIGHT: 240 LBS

## 2024-05-08 PROCEDURE — 93351 STRESS TTE COMPLETE: CPT

## 2024-05-08 PROCEDURE — 93320 DOPPLER ECHO COMPLETE: CPT

## 2024-05-08 PROCEDURE — 93325 DOPPLER ECHO COLOR FLOW MAPG: CPT

## 2024-05-09 ENCOUNTER — NON-APPOINTMENT (OUTPATIENT)
Age: 64
End: 2024-05-09

## 2024-05-14 ENCOUNTER — TRANSCRIPTION ENCOUNTER (OUTPATIENT)
Age: 64
End: 2024-05-14

## 2024-05-20 ENCOUNTER — NON-APPOINTMENT (OUTPATIENT)
Age: 64
End: 2024-05-20

## 2024-06-21 ENCOUNTER — APPOINTMENT (OUTPATIENT)
Dept: VASCULAR SURGERY | Facility: CLINIC | Age: 64
End: 2024-06-21
Payer: COMMERCIAL

## 2024-06-21 VITALS
DIASTOLIC BLOOD PRESSURE: 85 MMHG | SYSTOLIC BLOOD PRESSURE: 125 MMHG | HEIGHT: 65 IN | BODY MASS INDEX: 21.66 KG/M2 | HEART RATE: 65 BPM | WEIGHT: 130 LBS

## 2024-06-21 DIAGNOSIS — I83.93 ASYMPTOMATIC VARICOSE VEINS OF BILATERAL LOWER EXTREMITIES: ICD-10-CM

## 2024-06-21 PROCEDURE — 99203 OFFICE O/P NEW LOW 30 MIN: CPT

## 2024-06-21 NOTE — REVIEW OF SYSTEMS
[Fever] : no fever [Chills] : no chills [Lower Ext Edema] : no lower extremity edema [Limb Pain] : limb pain [Limb Swelling] : no limb swelling

## 2024-06-21 NOTE — PHYSICAL EXAM
[JVD] : no jugular venous distention  [Normal Breath Sounds] : Normal breath sounds [2+] : left 2+ [Ankle Swelling (On Exam)] : not present [Ankle Swelling Bilaterally] : bilaterally  [Varicose Veins Of Lower Extremities] : bilaterally [Ankle Swelling On The Right] : mild [] : bilaterally [Alert] : alert [Oriented to Person] : oriented to person [Oriented to Place] : oriented to place [Oriented to Time] : oriented to time [de-identified] : Awake and Alert [de-identified] : spider veins bilateral lower extremities [de-identified] : Appropriate

## 2024-06-21 NOTE — ASSESSMENT
[FreeTextEntry1] : 65 yo female with bilateral lower extremity spider veins. She has no edema.  We discussed undergoing a lower extremity venous duplex for venous insufficiency.  The patient wishes to defer a venous duplex at this time.  She is an excellent candidate for sclerotherapy.  The risks and benefits of sclerotherapy were discussed with the patient.  At this point she does not feel it is necessary to treat the spider veins but will contact the office if she wishes to proceed with further treatment.

## 2024-06-21 NOTE — HISTORY OF PRESENT ILLNESS
[FreeTextEntry1] : 64-year-old female presents to the office with a chief complaint of symptomatic spider veins.  The patient reports that she has occasional pain associated with spider veins of the right posterior leg.  She reports a long history of spider veins bilaterally.  She denies history of lower extremity edema.  She denies history of heaviness or aching in her lower extremities.  She denies a history of deep or superficial venous thrombosis.  She does not currently wear compression stockings.

## 2024-08-01 ENCOUNTER — TRANSCRIPTION ENCOUNTER (OUTPATIENT)
Age: 64
End: 2024-08-01

## 2024-08-15 DIAGNOSIS — E03.9 HYPOTHYROIDISM, UNSPECIFIED: ICD-10-CM

## 2024-08-15 DIAGNOSIS — N95.0 POSTMENOPAUSAL BLEEDING: ICD-10-CM

## 2024-08-15 DIAGNOSIS — N39.3 STRESS INCONTINENCE (FEMALE) (MALE): ICD-10-CM

## 2024-08-15 DIAGNOSIS — Z82.3 FAMILY HISTORY OF STROKE: ICD-10-CM

## 2024-08-20 ENCOUNTER — APPOINTMENT (OUTPATIENT)
Dept: GYNECOLOGIC ONCOLOGY | Facility: CLINIC | Age: 64
End: 2024-08-20
Payer: COMMERCIAL

## 2024-08-20 VITALS
OXYGEN SATURATION: 96 % | HEART RATE: 72 BPM | SYSTOLIC BLOOD PRESSURE: 135 MMHG | HEIGHT: 65 IN | DIASTOLIC BLOOD PRESSURE: 92 MMHG

## 2024-08-20 VITALS — WEIGHT: 245 LBS | BODY MASS INDEX: 40.77 KG/M2

## 2024-08-20 DIAGNOSIS — C54.1 MALIGNANT NEOPLASM OF ENDOMETRIUM: ICD-10-CM

## 2024-08-20 PROCEDURE — 99205 OFFICE O/P NEW HI 60 MIN: CPT

## 2024-08-20 PROCEDURE — 99459 PELVIC EXAMINATION: CPT

## 2024-08-20 PROCEDURE — G2211 COMPLEX E/M VISIT ADD ON: CPT | Mod: NC

## 2024-08-20 NOTE — PHYSICAL EXAM
[Chaperone Present] : A chaperone was present in the examining room during all aspects of the physical examination [72955] : A chaperone was present during the pelvic exam. [Fully active, able to carry on all pre-disease performance without restriction] : Status 0 - Fully active, able to carry on all pre-disease performance without restriction

## 2024-08-20 NOTE — PHYSICAL EXAM
[Chaperone Present] : A chaperone was present in the examining room during all aspects of the physical examination [37257] : A chaperone was present during the pelvic exam. [Fully active, able to carry on all pre-disease performance without restriction] : Status 0 - Fully active, able to carry on all pre-disease performance without restriction

## 2024-08-20 NOTE — DISCUSSION/SUMMARY
[Reviewed Clinical Lab Test(s)] : Results of clinical tests were reviewed. [Reviewed Radiology Report(s)] : Radiology reports were reviewed. [Discuss Alternatives/Risks/Benefits w/Patient] : All alternatives, risks, and benefits were discussed with the patient/family and all questions were answered.  Patient expressed good understanding and appreciates the importance of follow up as recommended. [Visit Time ___ Minutes] : [unfilled] minutes [Face to Face Time___ Minutes] : with [unfilled] minutes in face to face consultation. [FreeTextEntry1] : 63yo P0 w/ PMB and biopsy proven FIGO grade 2 endometrial cancer, MSI-stable. For surgical mgmt. -more than 50% of visit spent face to face with patient reviewing records and interpreting imaging/path/lab results, counseling and coordinating care -I reviewed in detail her diagnosis of FIGO grade 2 endometrial cancer and the natural hx of this disease. I reviewed standard of care surgical mgmt with hysterectomy/BSO/sentinel LN biopsy/possible full lymph node dissection. I reviewed R/B/A of surgery and surgical procedure in detail. -Different surgical approaches discussed including minimally invasive and open approaches. I recommend advanced laparoscopic robotic assisted total hysterectomy and bilateral salpingo-oophorectomy with sentinel lymph node dissection. The NCCN guidelines with regards to endometrial cancer staging were discussed. If sentinel lymph node biopsy is unsuccessful, or if the sentinel lymph node is found to be positive for disease, a full lymph node dissection will be performed on that side.  Complications that include, but are not limited to: bleeding, infection, injury to other organs including bowel, bladder, ureters, blood vessels, nerves; infections, blood clots, lymphedema, pneumonia, wound complications and prolonged hospital stay have all been discussed with the patient. Whenever minimally invasive surgery is attempted, there is a chance of needing to convert to laparotomy. The risk of occult injury requiring additional surgery also discussed. I have also provided her with the diagrams.  -I did explain to patient that given her BMI 40, discretion at time of surgery will be used to determine risk/benefit of full node dissection and obesity associated complications. all questions answered and patient expressed understanding -Surgical booking: robo tlh/bso/slnbx/possible laparotomy -ERAS, pre-op clearance at [facility], labs, covid testing as pre protocol, pt will need clearance from cardiologist -pt will need pre-op CT scan, order placed, will call with results -pain/fever/bleeding precautions given -f/u post-op
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
## 2024-08-23 ENCOUNTER — RESULT REVIEW (OUTPATIENT)
Age: 64
End: 2024-08-23

## 2024-08-26 DIAGNOSIS — G89.18 OTHER ACUTE POSTPROCEDURAL PAIN: ICD-10-CM

## 2024-08-26 RX ORDER — TRAMADOL HYDROCHLORIDE 50 MG/1
50 TABLET, COATED ORAL EVERY 6 HOURS
Qty: 20 | Refills: 0 | Status: ACTIVE | COMMUNITY
Start: 2024-08-26 | End: 1900-01-01

## 2024-08-28 ENCOUNTER — TRANSCRIPTION ENCOUNTER (OUTPATIENT)
Age: 64
End: 2024-08-28

## 2024-08-28 ENCOUNTER — APPOINTMENT (OUTPATIENT)
Dept: GYNECOLOGIC ONCOLOGY | Facility: HOSPITAL | Age: 64
End: 2024-08-28

## 2024-08-28 ENCOUNTER — RESULT REVIEW (OUTPATIENT)
Age: 64
End: 2024-08-28

## 2024-09-13 ENCOUNTER — APPOINTMENT (OUTPATIENT)
Dept: GYNECOLOGIC ONCOLOGY | Facility: CLINIC | Age: 64
End: 2024-09-13
Payer: COMMERCIAL

## 2024-09-13 VITALS — OXYGEN SATURATION: 96 % | HEART RATE: 68 BPM | SYSTOLIC BLOOD PRESSURE: 135 MMHG | DIASTOLIC BLOOD PRESSURE: 85 MMHG

## 2024-09-13 DIAGNOSIS — M89.9 DISORDER OF BONE, UNSPECIFIED: ICD-10-CM

## 2024-09-13 PROCEDURE — 99215 OFFICE O/P EST HI 40 MIN: CPT | Mod: 24

## 2024-09-13 PROCEDURE — G2211 COMPLEX E/M VISIT ADD ON: CPT | Mod: NC

## 2024-09-13 NOTE — DISCUSSION/SUMMARY
[Reviewed Clinical Lab Test(s)] : Results of clinical tests were reviewed. [Reviewed Radiology Report(s)] : Radiology reports were reviewed. [Discuss Alternatives/Risks/Benefits w/Patient] : All alternatives, risks, and benefits were discussed with the patient/family and all questions were answered.  Patient expressed good understanding and appreciates the importance of follow up as recommended. [Visit Time ___ Minutes] : [unfilled] minutes [Face to Face Time___ Minutes] : with [unfilled] minutes in face to face consultation. [FreeTextEntry1] : 65yo w/ stage IB pN0(i+), isolated tumor cells noted in pelvic lymph nodes, MMRp, DOI 62%, no LVI, for adjuvant EBRT. Patient recovering very well.  -more than 50% of visit spent face to face with patient counseling and coordinating care with high level complexity -I reviewed diagnosis, stage of disease, and treatment plan. I explained given the high risk factors noted on final pathology (grade, DOI, isolated tumor cells in LNs), I recommend pt receive adjuvant EBRT. I reviewed NCCN guidelines with patient and reviewed limited date on ITCs. explained that they are not large enough to up stage to stage 3 disease and current data does not support adjuvant chemotherapy when ITCs are found. Case was reviewed at tumor board and consensus was for EBRT. all questions answered and patient expressed understanding -pt lives close to [place] and will see [facility] there for consultation -rtc 3-4wks for vag cuff check -pt to continue light activity, can increase as tolerated, no heavy lifting, no intercourse, no swimming -pt will need bone scan to f/u L2 lesion on CT scan, order placed -pain/fever/bleeding precautions given

## 2024-09-13 NOTE — HISTORY OF PRESENT ILLNESS
[FreeTextEntry1] : 65yo P0 w/ PMB and biopsy proven FIGO grade 2 endometrial cancer, MMRp now s/p EUA, robo tlh, bso, right pelvic sentinel lymph node biopsy, left pelvic node dissection, vaginal laceration repair on 8/28/24  Dx: stage 1B, pN0(i+), FIGO grade 2 endometrioid carcinoma, MI 62%, no LVSI, MMRp, One out of three lymph node showing isolated tumor cells. Tx: robo tlh, bso, right pelvic sentinel lymph node biopsy, left pelvic node dissection, vaginal laceration repair on 8/28/24 Adjtx: referred to rad onc at [facility]  Since procedure, patient reports doing well. She reports minimal pain and has resumed most normal activity with good tolerance.  She reports normal appetite. She denies fever/bleeding/bloating. Reports normal urination and BMs. path results reviewed with pt in full detail. Pre-op scan with isolated L2 sclerotic bone lesion, nonspecific, bone scan recommended.  Path results reviewed. Pathology 8/28/24:  CANCER PROTOCOL Specimen Procedure: Total hysterectomy and bilateral salpingo-oophorectomy Hysterectomy Type: Laparoscopic, robotic-assisted Specimen Integrity: Intact Tumor Tumor Site: Endometrium Tumor Size: 5 Centimeters (cm) Histologic Type: Endometrioid carcinoma, NOS - No specific molecular profile (NSMP) endometrioid carcinoma Histologic Grade: FIGO grade 2 Two-Tier Grading System: Low grade (encompassing FIGO 2) Myometrial Invasion: Present Depth of Myometrial Invasion: 8 mm Myometrial Thickness: 13 mm Percentage of Myometrial Invasion 62% Adenomyosis: Not identified Uterine Serosa Involvement: Not identified Lower Uterine Segment Involvement: Not identified Cervical Stromal Involvement: Not identified Other Tissue / Organ Involvement: Not identified Peritoneal / Ascitic Fluid: Malignant cells not identified Lymphovascular Invasion (LVI): Not identified Margins Margin Status: All margins negative for invasive carcinoma Regional Lymph Nodes Regional Lymph Node Status: isolated tumor cells present right pelvic sentinel lymph node and left pelvic lymph node Lymph Nodes Examined Total Number of Pelvic Nodes Examined: 5 Number of Pelvic [place] Nodes Examined: 5 Total Number of Para-aortic Nodes Examined: 0 Pathologic Stage Classification (pTNM, AJCC 8th Edition) pT Category: pT1b pN Category:  pN0(i+): Isolated tumor cells in regional lymph node(s) no greater than 0. 2mm pM category: Not applicable - pM cannot be determined from the submitted specimen(s) FIGO STAGE: IB: Invasion equal to or more than half of the myometrium Additional Findings Additional Findings: leiomyomata Best Tumor Blocks for Future Studies  Specimen(s)  A.  RIGHT PELVIC SENTINEL LYMPH NODE B.  CERVIX, UTERUS, BILATERAL FALLOPIAN TUBES AND OVARIES C.  LEFT PELVIC LYMPH NODE  FINAL PATHOLOGIC DIAGNOSIS  A.  RIGHT PELVIC SENTINEL LYMPH NODE: - One out of two lymph node showing isolated tumor cells. - PanCK immunohistochemical stain highlight isolated tumor cells.  B.  CERVIX, UTERUS, BILATERAL FALLOPIAN TUBES AND OVARIES: - Endometrial carcinoma endometrioid type with focal mucinous and squamous differentiation. - Invasive tumor measures 5.0 x 4.6 x 4.2 cm. - Cervix with chronic cervicitis and reactive changes. - Right fallopian tube with paratubal cysts; negative for metastatic carcinoma. - Right ovary; negative for carcinoma. - Left Fallopian tube with focal acute inflammation and reactive changes, negative for carcinoma (p53 immunostain showed wild type staining. Ki-67 proliferative marker is low). - Left ovary; negative for carcinoma.  C.  LEFT PELVIC LYMPH NODE: - One out of three lymph node showing isolated tumor cells. - PanCK immunohistochemical stain highlight isolated tumor cells.  Diagnosis Comment  Immunohistochemical stains for p53 showed wild type expression. Tumor cells are positive for ER and MD. P16 showed patchy positive staining.  MMR panel performed on block B13: Antibody/Probe               Marker                 Result MLH-1         DNA Mismatch Repair Protein    Intact nuclear expression MSH-2         DNA Mismatch Repair Protein    Intact nuclear expression MSH-6         DNA Mismatch Repair Protein    Intact nuclear expression PMS2         DNA Mismatch Repair Protein    Intact nuclear expression  No loss of nuclear expression of MMR proteins:  low probability of microsatellite instability - high (MSI-high).   CT Scan 8/23/24: IMPRESSION:  Endometrium is poorly delineated/assessed on CT; suspect 2.4 cm endometrial mass versus submucosal fibroid near the fundus.   Nonspecific 4 mm sclerotic focus at the posterior aspect of the L2 vertebral body, presumably a bone island however given history of malignancy, metastatic disease is not excluded.  Prior outside studies, if available, would be helpful to evaluate stability. If no prior study is available, can consider bone scan, MRI or reassessment on short interval followup.   Otherwise, no definite  CT evidence of metastatic disease.   Hepatic steatosis.  Question subtle nodular hepatic contour, which may represent underlying cirrhosis.   Other findings, as above.

## 2024-09-20 ENCOUNTER — APPOINTMENT (OUTPATIENT)
Dept: RADIATION ONCOLOGY | Facility: CLINIC | Age: 64
End: 2024-09-20
Payer: COMMERCIAL

## 2024-09-20 VITALS
DIASTOLIC BLOOD PRESSURE: 80 MMHG | HEART RATE: 65 BPM | RESPIRATION RATE: 18 BRPM | SYSTOLIC BLOOD PRESSURE: 123 MMHG | OXYGEN SATURATION: 97 % | WEIGHT: 240 LBS | BODY MASS INDEX: 39.94 KG/M2

## 2024-09-20 DIAGNOSIS — C54.1 MALIGNANT NEOPLASM OF ENDOMETRIUM: ICD-10-CM

## 2024-09-20 PROCEDURE — 99205 OFFICE O/P NEW HI 60 MIN: CPT

## 2024-09-20 PROCEDURE — G2211 COMPLEX E/M VISIT ADD ON: CPT | Mod: NC

## 2024-09-20 NOTE — REVIEW OF SYSTEMS
[Anxiety] : anxiety [Negative] : Allergic/Immunologic [Constipation] : no constipation [Diarrhea] : no diarrhea [Vaginal Discharge] : no vaginal discharge [Dysmenorrhea/Abn Vaginal Bleeding] : no dysmenorrhea/abnormal vaginal bleeding [de-identified] : vaginal incisions

## 2024-09-20 NOTE — PHYSICAL EXAM
[Normal] : oriented to person, place and time, the affect was normal, the mood was normal and not anxious [de-identified] : Deferred as patient has not had her cuff check yet

## 2024-09-20 NOTE — PHYSICAL EXAM
[Normal] : oriented to person, place and time, the affect was normal, the mood was normal and not anxious [de-identified] : Deferred as patient has not had her cuff check yet

## 2024-09-20 NOTE — HISTORY OF PRESENT ILLNESS
[FreeTextEntry1] : Ms. Ling is a 64-year-old female with newly diagnosed Stage 1B FIGO Grade 2 endometrial cancer s/p robotic total laparoscopic, hysterectomy, bilateral salpingo-oophorectomy, right pelvic sentinel lymph node biopsy, left pelvic lymph node dissection, vaginal laceration repair with Dr. Soriano. She presents today for consultation to discuss the role of radiation in her management.   Patient initially presented to her GYN in May 2024 for PMB s/p D&C hysteroscopy by Dr. Chavez ([place]). Pathology revealed benign endocervical glands & squamous epithelium.   She then continued to have PMB and was found to have hematometria.   7/29/24 Pelvic ultrasound showed an abnormally thickened endometrium measuring 2.9cm.    7/31/24 D&C with Dr. Connors. Pathology revealed endometrioid carcinoma, FIGO Grade 2. Endometrial polyp involved by endometrioid carcinoma. Immunohistochemical stain for p53 shows wild type expression. Tumor cells are positive for ER, SC, vimentin, and p16 showed patchy positive staining. MMR intact. These results show low probability of microsatellite instability-high (MSI-H).   8/23/24 CT Abdomen & Pelvis ([place]) demonstrated:  1. Endometrium is poorly delineated/assessed on CT; suspect 2.4 cm endometrial mass versus submucosal fibroid near the fundus.   2. Nonspecific 4 mm sclerotic focus at the posterior aspect of the L2 vertebral body, presumably a bone island however given history of malignancy, metastatic disease is not excluded. Prior outside studies, if available, would be helpful to evaluate stability. If no prior study is available, can consider bone scan, MRI or reassessment on short interval followup. Otherwise, no definite CT evidence of metastatic disease.   3. Hepatic steatosis. Question subtle nodular hepatic contour, which may represent underlying cirrhosis.  4. No lymphadenopathy.   8/28/24 Examination under anesthesia, robotic total laparoscopic, hysterectomy, bilateral salpingo-oophorectomy, right pelvic sentinel lymph node biopsy, left pelvic lymph node dissection, vaginal laceration repair with Dr. Soriano.   Pathology:  CANCER PROTOCOL  Specimen  Procedure: Total hysterectomy and bilateral salpingo-oophorectomy  Hysterectomy Type: Laparoscopic, robotic-assisted  Specimen Integrity: Intact  Tumor  Tumor Site: Endometrium  Tumor Size: 5 Centimeters (cm)  Histologic Type: Endometrioid carcinoma, NOS - No specific molecular profile (NSMP) endometrioid carcinoma  Histologic Grade: FIGO grade 2  Two-Tier Grading System: Low grade (encompassing FIGO 2)  Myometrial Invasion: Present  Depth of Myometrial Invasion: 8 mm  Myometrial Thickness: 13 mm  Percentage of Myometrial Invasion 62%  Adenomyosis: Not identified  Uterine Serosa Involvement: Not identified  Lower Uterine Segment Involvement: Not identified  Cervical Stromal Involvement: Not identified  Other Tissue / Organ Involvement: Not identified  Peritoneal / Ascitic Fluid: Malignant cells not identified  Lymphovascular Invasion (LVI): Not identified   Margins  Margin Status: All margins negative for invasive carcinoma  Regional Lymph Nodes   Regional Lymph Node Status: isolated tumor cells present right pelvic sentinel lymph node  and left pelvic lymph node  Lymph Nodes Examined  Total Number of Pelvic Nodes Examined: 5  Number of Pelvic [place] Nodes Examined: 5  Total Number of Para-aortic Nodes Examined: 0   Pathologic Stage Classification (pTNM, AJCC 8th Edition)  pT Category: pT1b  pN Category: pN0(i+): Isolated tumor cells in regional lymph node(s) no greater than 0.  2mm pM category: Not applicable - pM cannot be determined from the submitted specimen(s)  FIGO STAGE: IB: Invasion equal to or more than half of the myometrium   Specimens  A. RIGHT PELVIC SENTINEL LYMPH NODE B. CERVIX, UTERUS, BILATERAL FALLOPIAN TUBES AND OVARIES  C. LEFT PELVIC LYMPH NODE   FINAL PATHOLOGIC DIAGNOSIS  A. RIGHT PELVIC SENTINEL LYMPH NODE:  - One out of two lymph node showing isolated tumor cells.  - PanCK immunohistochemical stain highlight isolated tumor cells.  B. CERVIX, UTERUS, BILATERAL FALLOPIAN TUBES AND OVARIES:  - Endometrial carcinoma endometrioid type with focal mucinous and squamous differentiation.  - Invasive tumor measures 5.0 x 4.6 x 4.2 cm.  - Cervix with chronic cervicitis and reactive changes.  - Right fallopian tube with paratubal cysts; negative for metastatic carcinoma.  - Right ovary; negative for carcinoma.  - Left Fallopian tube with focal acute inflammation and reactive changes, negative for carcinoma (p53 immunostain showed wild type staining. Ki-67 proliferative marker is low).  - Left ovary; negative for carcinoma.   C. LEFT PELVIC LYMPH NODE:  - One out of three lymph node showing isolated tumor cells.  - PanCK immunohistochemical stain highlight isolated tumor cells.   Diagnosis Comment:  Immunohistochemical stains for p53 showed wild type expression. Tumor cells are positive  for ER and SC. P16 showed patchy positive staining.   MMR panel performed on block B13:  Antibody/Probe Marker Result:  MLH-1, MSH-2, MSH-6, and PMS2: DNA Mismatch Repair Protein Intact nuclear expression  No loss of nuclear expression of MMR proteins: low probability of microsatellite instability - high (MSI-high).   9/20/24 Ms. Ling presents today to discuss treatment with radiation as referred by Dr. Soriano. Patient is 3.5 weeks s/p surgery and is healing well, she reported of some spotting. She is scheduled for a follow-up and cuff check with Dr. Soriano on 10/4/24. Patient is planning to move to [place] permanently and wants to start RT ASAP.

## 2024-09-20 NOTE — REVIEW OF SYSTEMS
[Anxiety] : anxiety [Negative] : Allergic/Immunologic [Constipation] : no constipation [Diarrhea] : no diarrhea [Vaginal Discharge] : no vaginal discharge [Dysmenorrhea/Abn Vaginal Bleeding] : no dysmenorrhea/abnormal vaginal bleeding [de-identified] : vaginal incisions

## 2024-09-20 NOTE — HISTORY OF PRESENT ILLNESS
[FreeTextEntry1] : Ms. Ling is a 64-year-old female with newly diagnosed Stage 1B FIGO Grade 2 endometrial cancer s/p robotic total laparoscopic, hysterectomy, bilateral salpingo-oophorectomy, right pelvic sentinel lymph node biopsy, left pelvic lymph node dissection, vaginal laceration repair with Dr. Soriano. She presents today for consultation to discuss the role of radiation in her management.   Patient initially presented to her GYN in May 2024 for PMB s/p D&C hysteroscopy by Dr. Chavez ([place]). Pathology revealed benign endocervical glands & squamous epithelium.   She then continued to have PMB and was found to have hematometria.   7/29/24 Pelvic ultrasound showed an abnormally thickened endometrium measuring 2.9cm.    7/31/24 D&C with Dr. Connors. Pathology revealed endometrioid carcinoma, FIGO Grade 2. Endometrial polyp involved by endometrioid carcinoma. Immunohistochemical stain for p53 shows wild type expression. Tumor cells are positive for ER, DC, vimentin, and p16 showed patchy positive staining. MMR intact. These results show low probability of microsatellite instability-high (MSI-H).   8/23/24 CT Abdomen & Pelvis ([place]) demonstrated:  1. Endometrium is poorly delineated/assessed on CT; suspect 2.4 cm endometrial mass versus submucosal fibroid near the fundus.   2. Nonspecific 4 mm sclerotic focus at the posterior aspect of the L2 vertebral body, presumably a bone island however given history of malignancy, metastatic disease is not excluded. Prior outside studies, if available, would be helpful to evaluate stability. If no prior study is available, can consider bone scan, MRI or reassessment on short interval followup. Otherwise, no definite CT evidence of metastatic disease.   3. Hepatic steatosis. Question subtle nodular hepatic contour, which may represent underlying cirrhosis.  4. No lymphadenopathy.   8/28/24 Examination under anesthesia, robotic total laparoscopic, hysterectomy, bilateral salpingo-oophorectomy, right pelvic sentinel lymph node biopsy, left pelvic lymph node dissection, vaginal laceration repair with Dr. Soriano.   Pathology:  CANCER PROTOCOL  Specimen  Procedure: Total hysterectomy and bilateral salpingo-oophorectomy  Hysterectomy Type: Laparoscopic, robotic-assisted  Specimen Integrity: Intact  Tumor  Tumor Site: Endometrium  Tumor Size: 5 Centimeters (cm)  Histologic Type: Endometrioid carcinoma, NOS - No specific molecular profile (NSMP) endometrioid carcinoma  Histologic Grade: FIGO grade 2  Two-Tier Grading System: Low grade (encompassing FIGO 2)  Myometrial Invasion: Present  Depth of Myometrial Invasion: 8 mm  Myometrial Thickness: 13 mm  Percentage of Myometrial Invasion 62%  Adenomyosis: Not identified  Uterine Serosa Involvement: Not identified  Lower Uterine Segment Involvement: Not identified  Cervical Stromal Involvement: Not identified  Other Tissue / Organ Involvement: Not identified  Peritoneal / Ascitic Fluid: Malignant cells not identified  Lymphovascular Invasion (LVI): Not identified   Margins  Margin Status: All margins negative for invasive carcinoma  Regional Lymph Nodes   Regional Lymph Node Status: isolated tumor cells present right pelvic sentinel lymph node  and left pelvic lymph node  Lymph Nodes Examined  Total Number of Pelvic Nodes Examined: 5  Number of Pelvic [place] Nodes Examined: 5  Total Number of Para-aortic Nodes Examined: 0   Pathologic Stage Classification (pTNM, AJCC 8th Edition)  pT Category: pT1b  pN Category: pN0(i+): Isolated tumor cells in regional lymph node(s) no greater than 0.  2mm pM category: Not applicable - pM cannot be determined from the submitted specimen(s)  FIGO STAGE: IB: Invasion equal to or more than half of the myometrium   Specimens  A. RIGHT PELVIC SENTINEL LYMPH NODE B. CERVIX, UTERUS, BILATERAL FALLOPIAN TUBES AND OVARIES  C. LEFT PELVIC LYMPH NODE   FINAL PATHOLOGIC DIAGNOSIS  A. RIGHT PELVIC SENTINEL LYMPH NODE:  - One out of two lymph node showing isolated tumor cells.  - PanCK immunohistochemical stain highlight isolated tumor cells.  B. CERVIX, UTERUS, BILATERAL FALLOPIAN TUBES AND OVARIES:  - Endometrial carcinoma endometrioid type with focal mucinous and squamous differentiation.  - Invasive tumor measures 5.0 x 4.6 x 4.2 cm.  - Cervix with chronic cervicitis and reactive changes.  - Right fallopian tube with paratubal cysts; negative for metastatic carcinoma.  - Right ovary; negative for carcinoma.  - Left Fallopian tube with focal acute inflammation and reactive changes, negative for carcinoma (p53 immunostain showed wild type staining. Ki-67 proliferative marker is low).  - Left ovary; negative for carcinoma.   C. LEFT PELVIC LYMPH NODE:  - One out of three lymph node showing isolated tumor cells.  - PanCK immunohistochemical stain highlight isolated tumor cells.   Diagnosis Comment:  Immunohistochemical stains for p53 showed wild type expression. Tumor cells are positive  for ER and DC. P16 showed patchy positive staining.   MMR panel performed on block B13:  Antibody/Probe Marker Result:  MLH-1, MSH-2, MSH-6, and PMS2: DNA Mismatch Repair Protein Intact nuclear expression  No loss of nuclear expression of MMR proteins: low probability of microsatellite instability - high (MSI-high).   9/20/24 Ms. Ling presents today to discuss treatment with radiation as referred by Dr. Soriano. Patient is 3.5 weeks s/p surgery and is healing well, she reported of some spotting. She is scheduled for a follow-up and cuff check with Dr. Soriano on 10/4/24. Patient is planning to move to [place] permanently and wants to start RT ASAP.

## 2024-09-20 NOTE — REASON FOR VISIT
[Consideration of Curative Therapy] : consideration of curative therapy for [Endometrial Cancer] : endometrial cancer [Friend] : friend

## 2024-09-27 ENCOUNTER — NON-APPOINTMENT (OUTPATIENT)
Age: 64
End: 2024-09-27

## 2024-10-04 ENCOUNTER — APPOINTMENT (OUTPATIENT)
Dept: GYNECOLOGIC ONCOLOGY | Facility: CLINIC | Age: 64
End: 2024-10-04
Payer: COMMERCIAL

## 2024-10-04 VITALS — HEART RATE: 97 BPM | DIASTOLIC BLOOD PRESSURE: 79 MMHG | SYSTOLIC BLOOD PRESSURE: 133 MMHG | OXYGEN SATURATION: 97 %

## 2024-10-04 DIAGNOSIS — C54.1 MALIGNANT NEOPLASM OF ENDOMETRIUM: ICD-10-CM

## 2024-10-04 PROCEDURE — G2211 COMPLEX E/M VISIT ADD ON: CPT | Mod: NC

## 2024-10-04 PROCEDURE — 99024 POSTOP FOLLOW-UP VISIT: CPT

## 2024-10-04 NOTE — PHYSICAL EXAM
[Fully active, able to carry on all pre-disease performance without restriction] : Status 0 - Fully active, able to carry on all pre-disease performance without restriction [Chaperone Present] : A chaperone was present in the examining room during all aspects of the physical examination [10162] : A chaperone was present during the pelvic exam.

## 2024-10-04 NOTE — HISTORY OF PRESENT ILLNESS
[FreeTextEntry1] : 65yo P0 w/ PMB and biopsy proven FIGO grade 2 endometrial cancer, MMRp now s/p EUA, robo tlh, bso, right pelvic sentinel lymph node biopsy, left pelvic node dissection, vaginal laceration repair on 8/28/24  Dx: stage 1B, pN0(i+), FIGO grade 2 endometrioid carcinoma, MI 62%, no LVSI, MMRp, One out of three lymph node showing isolated tumor cells. Tx: robo tlh, bso, right pelvic sentinel lymph node biopsy, left pelvic node dissection, vaginal laceration repair on 8/28/24 Adjtx: EBRT (Dr. Mascorro) 10/9/24 start date  Since last visit patient reports doing well. She has resumed most normal activity with good tolerance.  She reports normal appetite. She denies pain/fever/bleeding/bloating. Reports normal urination and BMs. path results reviewed with pt in full detail. Pre-op scan with isolated L2 sclerotic bone lesion, nonspecific, bone scan recommended however patient has decided not to pursue this at this time due to clostraphobia and concerns about bone scan dye. will plan for post-tx ct scan to re-eval lesion. pt has [facility] care with [facility] and will start EBRT next week  Pathology 8/28/24:  CANCER PROTOCOL Specimen Procedure: Total hysterectomy and bilateral salpingo-oophorectomy Hysterectomy Type: Laparoscopic, robotic-assisted Specimen Integrity: Intact Tumor Tumor Site: Endometrium Tumor Size: 5 Centimeters (cm) Histologic Type: Endometrioid carcinoma, NOS - No specific molecular profile (NSMP) endometrioid carcinoma Histologic Grade: FIGO grade 2 Two-Tier Grading System: Low grade (encompassing FIGO 2) Myometrial Invasion: Present Depth of Myometrial Invasion: 8 mm Myometrial Thickness: 13 mm Percentage of Myometrial Invasion 62% Adenomyosis: Not identified Uterine Serosa Involvement: Not identified Lower Uterine Segment Involvement: Not identified Cervical Stromal Involvement: Not identified Other Tissue / Organ Involvement: Not identified Peritoneal / Ascitic Fluid: Malignant cells not identified Lymphovascular Invasion (LVI): Not identified Margins Margin Status: All margins negative for invasive carcinoma Regional Lymph Nodes Regional Lymph Node Status: isolated tumor cells present right pelvic sentinel lymph node and left pelvic lymph node Lymph Nodes Examined Total Number of Pelvic Nodes Examined: 5 Number of Pelvic [place] Nodes Examined: 5 Total Number of Para-aortic Nodes Examined: 0 Pathologic Stage Classification (pTNM, AJCC 8th Edition) pT Category: pT1b pN Category:  pN0(i+): Isolated tumor cells in regional lymph node(s) no greater than 0. 2mm pM category: Not applicable - pM cannot be determined from the submitted specimen(s) FIGO STAGE: IB: Invasion equal to or more than half of the myometrium Additional Findings Additional Findings: leiomyomata Best Tumor Blocks for Future Studies Specimen(s) A.  RIGHT PELVIC SENTINEL LYMPH NODE B.  CERVIX, UTERUS, BILATERAL FALLOPIAN TUBES AND OVARIES C.  LEFT PELVIC LYMPH NODE FINAL PATHOLOGIC DIAGNOSIS A.  RIGHT PELVIC SENTINEL LYMPH NODE: - One out of two lymph node showing isolated tumor cells. - PanCK immunohistochemical stain highlight isolated tumor cells. B.  CERVIX, UTERUS, BILATERAL FALLOPIAN TUBES AND OVARIES: - Endometrial carcinoma endometrioid type with focal mucinous and squamous differentiation. - Invasive tumor measures 5.0 x 4.6 x 4.2 cm. - Cervix with chronic cervicitis and reactive changes. - Right fallopian tube with paratubal cysts; negative for metastatic carcinoma. - Right ovary; negative for carcinoma. - Left Fallopian tube with focal acute inflammation and reactive changes, negative for carcinoma (p53 immunostain showed wild type staining. Ki-67 proliferative marker is low). - Left ovary; negative for carcinoma. C.  LEFT PELVIC LYMPH NODE: - One out of three lymph node showing isolated tumor cells. - PanCK immunohistochemical stain highlight isolated tumor cells. Diagnosis Comment Immunohistochemical stains for p53 showed wild type expression. Tumor cells are positive for ER and AK. P16 showed patchy positive staining. MMR panel performed on block B13: Antibody/Probe               Marker                 Result MLH-1         DNA Mismatch Repair Protein    Intact nuclear expression MSH-2         DNA Mismatch Repair Protein    Intact nuclear expression MSH-6         DNA Mismatch Repair Protein    Intact nuclear expression PMS2         DNA Mismatch Repair Protein    Intact nuclear expression No loss of nuclear expression of MMR proteins: low probability of microsatellite instability - high (MSI-high).  CT Scan 8/23/24: IMPRESSION:  Endometrium is poorly delineated/assessed on CT; suspect 2.4 cm endometrial mass versus submucosal fibroid near the fundus.  Nonspecific 4 mm sclerotic focus at the posterior aspect of the L2 vertebral body, presumably a bone island however given history of malignancy, metastatic disease is not excluded.  Prior outside studies, if available, would be helpful to evaluate stability. If no prior study is available, can consider bone scan, MRI or reassessment on short interval followup. Otherwise, no definite CT evidence of metastatic disease.  Hepatic steatosis.  Question subtle nodular hepatic contour, which may represent underlying cirrhosis.  Other findings, as above.

## 2024-10-04 NOTE — DISCUSSION/SUMMARY
[Reviewed Clinical Lab Test(s)] : Results of clinical tests were reviewed. [Reviewed Radiology Report(s)] : Radiology reports were reviewed. [Discuss Alternatives/Risks/Benefits w/Patient] : All alternatives, risks, and benefits were discussed with the patient/family and all questions were answered.  Patient expressed good understanding and appreciates the importance of follow up as recommended. [Visit Time ___ Minutes] : [unfilled] minutes [Face to Face Time___ Minutes] : with [unfilled] minutes in face to face consultation. [FreeTextEntry1] : 63yo w/ stage IB pN0(i+), isolated tumor cells noted in pelvic lymph nodes, MMRp, DOI 62%, no LVI, for adjuvant EBRT. Due to start next week. Well healed on exam today. MINH. -more than 50% of visit spent face to face with patient counseling and coordinating care with high level complexity -I reviewed diagnosis, stage of disease, and treatment plan. I explained given the high risk factors noted on final pathology (grade, DOI, isolated tumor cells in LNs), I recommend pt receive adjuvant EBRT. I reviewed NCCN guidelines with patient and reviewed limited date on ITCs. explained that they are not large enough to up stage to stage 3 disease and current data does not support adjuvant chemotherapy when ITCs are found. Case was reviewed at tumor board and consensus was for EBRT. all questions answered and patient expressed understanding -pt seen by [facility] already. to start EBRT 10/9/24 -rt early December for post-rt visit -anticipate post-tx scan 2 months after RT to f/u L2 lesion seen on pre-op scan -pt can resume all baseline activities -pain/fever/bleeding precautions given

## 2024-10-04 NOTE — DISCUSSION/SUMMARY
[Reviewed Clinical Lab Test(s)] : Results of clinical tests were reviewed. [Reviewed Radiology Report(s)] : Radiology reports were reviewed. [Discuss Alternatives/Risks/Benefits w/Patient] : All alternatives, risks, and benefits were discussed with the patient/family and all questions were answered.  Patient expressed good understanding and appreciates the importance of follow up as recommended. [Visit Time ___ Minutes] : [unfilled] minutes [Face to Face Time___ Minutes] : with [unfilled] minutes in face to face consultation. [FreeTextEntry1] : 63yo w/ stage IB pN0(i+), isolated tumor cells noted in pelvic lymph nodes, MMRp, DOI 62%, no LVI, for adjuvant EBRT. Due to start next week. Well healed on exam today. MINH. -more than 50% of visit spent face to face with patient counseling and coordinating care with high level complexity -I reviewed diagnosis, stage of disease, and treatment plan. I explained given the high risk factors noted on final pathology (grade, DOI, isolated tumor cells in LNs), I recommend pt receive adjuvant EBRT. I reviewed NCCN guidelines with patient and reviewed limited date on ITCs. explained that they are not large enough to up stage to stage 3 disease and current data does not support adjuvant chemotherapy when ITCs are found. Case was reviewed at tumor board and consensus was for EBRT. all questions answered and patient expressed understanding -pt seen by [facility] already. to start EBRT 10/9/24 -rt early December for post-rt visit -anticipate post-tx scan 2 months after RT to f/u L2 lesion seen on pre-op scan -pt can resume all baseline activities -pain/fever/bleeding precautions given

## 2024-10-04 NOTE — PHYSICAL EXAM
[Fully active, able to carry on all pre-disease performance without restriction] : Status 0 - Fully active, able to carry on all pre-disease performance without restriction [Chaperone Present] : A chaperone was present in the examining room during all aspects of the physical examination [62459] : A chaperone was present during the pelvic exam.

## 2024-10-04 NOTE — HISTORY OF PRESENT ILLNESS
[FreeTextEntry1] : 65yo P0 w/ PMB and biopsy proven FIGO grade 2 endometrial cancer, MMRp now s/p EUA, robo tlh, bso, right pelvic sentinel lymph node biopsy, left pelvic node dissection, vaginal laceration repair on 8/28/24  Dx: stage 1B, pN0(i+), FIGO grade 2 endometrioid carcinoma, MI 62%, no LVSI, MMRp, One out of three lymph node showing isolated tumor cells. Tx: robo tlh, bso, right pelvic sentinel lymph node biopsy, left pelvic node dissection, vaginal laceration repair on 8/28/24 Adjtx: EBRT (Dr. Mascorro) 10/9/24 start date  Since last visit patient reports doing well. She has resumed most normal activity with good tolerance.  She reports normal appetite. She denies pain/fever/bleeding/bloating. Reports normal urination and BMs. path results reviewed with pt in full detail. Pre-op scan with isolated L2 sclerotic bone lesion, nonspecific, bone scan recommended however patient has decided not to pursue this at this time due to clostraphobia and concerns about bone scan dye. will plan for post-tx ct scan to re-eval lesion. pt has established care with [facility] and will start EBRT next week  Pathology 8/28/24:  CANCER PROTOCOL Specimen Procedure: Total hysterectomy and bilateral salpingo-oophorectomy Hysterectomy Type: Laparoscopic, robotic-assisted Specimen Integrity: Intact Tumor Tumor Site: Endometrium Tumor Size: 5 Centimeters (cm) Histologic Type: Endometrioid carcinoma, NOS - No specific molecular profile (NSMP) endometrioid carcinoma Histologic Grade: FIGO grade 2 Two-Tier Grading System: Low grade (encompassing FIGO 2) Myometrial Invasion: Present Depth of Myometrial Invasion: 8 mm Myometrial Thickness: 13 mm Percentage of Myometrial Invasion 62% Adenomyosis: Not identified Uterine Serosa Involvement: Not identified Lower Uterine Segment Involvement: Not identified Cervical Stromal Involvement: Not identified Other Tissue / Organ Involvement: Not identified Peritoneal / Ascitic Fluid: Malignant cells not identified Lymphovascular Invasion (LVI): Not identified Margins Margin Status: All margins negative for invasive carcinoma Regional Lymph Nodes Regional Lymph Node Status: isolated tumor cells present right pelvic sentinel lymph node and left pelvic lymph node Lymph Nodes Examined Total Number of Pelvic Nodes Examined: 5 Number of Pelvic [place] Nodes Examined: 5 Total Number of Para-aortic Nodes Examined: 0 Pathologic Stage Classification (pTNM, AJCC 8th Edition) pT Category: pT1b pN Category:  pN0(i+): Isolated tumor cells in regional lymph node(s) no greater than 0. 2mm pM category: Not applicable - pM cannot be determined from the submitted specimen(s) FIGO STAGE: IB: Invasion equal to or more than half of the myometrium Additional Findings Additional Findings: leiomyomata Best Tumor Blocks for Future Studies Specimen(s) A.  RIGHT PELVIC SENTINEL LYMPH NODE B.  CERVIX, UTERUS, BILATERAL FALLOPIAN TUBES AND OVARIES C.  LEFT PELVIC LYMPH NODE FINAL PATHOLOGIC DIAGNOSIS A.  RIGHT PELVIC SENTINEL LYMPH NODE: - One out of two lymph node showing isolated tumor cells. - PanCK immunohistochemical stain highlight isolated tumor cells. B.  CERVIX, UTERUS, BILATERAL FALLOPIAN TUBES AND OVARIES: - Endometrial carcinoma endometrioid type with focal mucinous and squamous differentiation. - Invasive tumor measures 5.0 x 4.6 x 4.2 cm. - Cervix with chronic cervicitis and reactive changes. - Right fallopian tube with paratubal cysts; negative for metastatic carcinoma. - Right ovary; negative for carcinoma. - Left Fallopian tube with focal acute inflammation and reactive changes, negative for carcinoma (p53 immunostain showed wild type staining. Ki-67 proliferative marker is low). - Left ovary; negative for carcinoma. C.  LEFT PELVIC LYMPH NODE: - One out of three lymph node showing isolated tumor cells. - PanCK immunohistochemical stain highlight isolated tumor cells. Diagnosis Comment Immunohistochemical stains for p53 showed wild type expression. Tumor cells are positive for ER and ID. P16 showed patchy positive staining. MMR panel performed on block B13: Antibody/Probe               Marker                 Result MLH-1         DNA Mismatch Repair Protein    Intact nuclear expression MSH-2         DNA Mismatch Repair Protein    Intact nuclear expression MSH-6         DNA Mismatch Repair Protein    Intact nuclear expression PMS2         DNA Mismatch Repair Protein    Intact nuclear expression No loss of nuclear expression of MMR proteins: low probability of microsatellite instability - high (MSI-high).  CT Scan 8/23/24: IMPRESSION:  Endometrium is poorly delineated/assessed on CT; suspect 2.4 cm endometrial mass versus submucosal fibroid near the fundus.  Nonspecific 4 mm sclerotic focus at the posterior aspect of the L2 vertebral body, presumably a bone island however given history of malignancy, metastatic disease is not excluded.  Prior outside studies, if available, would be helpful to evaluate stability. If no prior study is available, can consider bone scan, MRI or reassessment on short interval followup. Otherwise, no definite CT evidence of metastatic disease.  Hepatic steatosis.  Question subtle nodular hepatic contour, which may represent underlying cirrhosis.  Other findings, as above.

## 2024-10-09 ENCOUNTER — NON-APPOINTMENT (OUTPATIENT)
Age: 64
End: 2024-10-09

## 2024-10-09 VITALS
OXYGEN SATURATION: 97 % | DIASTOLIC BLOOD PRESSURE: 84 MMHG | HEART RATE: 72 BPM | RESPIRATION RATE: 16 BRPM | SYSTOLIC BLOOD PRESSURE: 134 MMHG

## 2024-10-16 ENCOUNTER — NON-APPOINTMENT (OUTPATIENT)
Age: 64
End: 2024-10-16

## 2024-10-16 VITALS
HEART RATE: 62 BPM | OXYGEN SATURATION: 97 % | WEIGHT: 240 LBS | TEMPERATURE: 98 F | DIASTOLIC BLOOD PRESSURE: 83 MMHG | SYSTOLIC BLOOD PRESSURE: 144 MMHG | BODY MASS INDEX: 39.94 KG/M2 | RESPIRATION RATE: 16 BRPM

## 2024-10-23 ENCOUNTER — NON-APPOINTMENT (OUTPATIENT)
Age: 64
End: 2024-10-23

## 2024-10-23 VITALS
HEART RATE: 66 BPM | DIASTOLIC BLOOD PRESSURE: 90 MMHG | RESPIRATION RATE: 18 BRPM | OXYGEN SATURATION: 97 % | SYSTOLIC BLOOD PRESSURE: 131 MMHG

## 2024-10-30 ENCOUNTER — NON-APPOINTMENT (OUTPATIENT)
Age: 64
End: 2024-10-30

## 2024-10-30 VITALS
OXYGEN SATURATION: 99 % | DIASTOLIC BLOOD PRESSURE: 89 MMHG | BODY MASS INDEX: 39.99 KG/M2 | SYSTOLIC BLOOD PRESSURE: 154 MMHG | HEART RATE: 60 BPM | WEIGHT: 240 LBS | HEIGHT: 65 IN | RESPIRATION RATE: 16 BRPM

## 2024-11-06 ENCOUNTER — NON-APPOINTMENT (OUTPATIENT)
Age: 64
End: 2024-11-06

## 2024-11-06 VITALS
SYSTOLIC BLOOD PRESSURE: 167 MMHG | RESPIRATION RATE: 18 BRPM | OXYGEN SATURATION: 97 % | DIASTOLIC BLOOD PRESSURE: 97 MMHG | HEART RATE: 66 BPM

## 2024-11-13 ENCOUNTER — NON-APPOINTMENT (OUTPATIENT)
Age: 64
End: 2024-11-13

## 2024-11-13 VITALS
WEIGHT: 238.19 LBS | BODY MASS INDEX: 39.64 KG/M2 | SYSTOLIC BLOOD PRESSURE: 163 MMHG | DIASTOLIC BLOOD PRESSURE: 96 MMHG | OXYGEN SATURATION: 97 % | HEART RATE: 68 BPM | RESPIRATION RATE: 18 BRPM

## 2024-11-20 ENCOUNTER — APPOINTMENT (OUTPATIENT)
Dept: GYNECOLOGIC ONCOLOGY | Facility: CLINIC | Age: 64
End: 2024-11-20
Payer: COMMERCIAL

## 2024-11-20 VITALS — HEART RATE: 75 BPM | DIASTOLIC BLOOD PRESSURE: 84 MMHG | OXYGEN SATURATION: 98 % | SYSTOLIC BLOOD PRESSURE: 128 MMHG

## 2024-11-20 DIAGNOSIS — C54.1 MALIGNANT NEOPLASM OF ENDOMETRIUM: ICD-10-CM

## 2024-11-20 DIAGNOSIS — M89.9 DISORDER OF BONE, UNSPECIFIED: ICD-10-CM

## 2024-11-20 PROCEDURE — 99024 POSTOP FOLLOW-UP VISIT: CPT

## 2024-11-20 PROCEDURE — G2211 COMPLEX E/M VISIT ADD ON: CPT | Mod: NC

## 2024-12-02 ENCOUNTER — APPOINTMENT (OUTPATIENT)
Dept: RADIATION ONCOLOGY | Facility: CLINIC | Age: 64
End: 2024-12-02

## 2024-12-02 ENCOUNTER — APPOINTMENT (OUTPATIENT)
Dept: RADIATION ONCOLOGY | Facility: CLINIC | Age: 64
End: 2024-12-02
Payer: COMMERCIAL

## 2024-12-02 VITALS
WEIGHT: 236 LBS | HEART RATE: 74 BPM | BODY MASS INDEX: 39.27 KG/M2 | DIASTOLIC BLOOD PRESSURE: 62 MMHG | OXYGEN SATURATION: 96 % | RESPIRATION RATE: 16 BRPM | SYSTOLIC BLOOD PRESSURE: 107 MMHG

## 2024-12-02 DIAGNOSIS — C54.1 MALIGNANT NEOPLASM OF ENDOMETRIUM: ICD-10-CM

## 2024-12-02 PROCEDURE — 99024 POSTOP FOLLOW-UP VISIT: CPT
